# Patient Record
Sex: FEMALE | Race: WHITE | Employment: FULL TIME | URBAN - METROPOLITAN AREA
[De-identification: names, ages, dates, MRNs, and addresses within clinical notes are randomized per-mention and may not be internally consistent; named-entity substitution may affect disease eponyms.]

---

## 2022-03-18 PROBLEM — F32.A DEPRESSION: Status: ACTIVE | Noted: 2017-10-17

## 2022-03-18 PROBLEM — E03.9 HYPOTHYROIDISM: Status: ACTIVE | Noted: 2017-10-17

## 2022-03-19 PROBLEM — I10 HIGH BLOOD PRESSURE: Status: ACTIVE | Noted: 2017-10-17

## 2022-03-19 PROBLEM — E11.9 DM (DIABETES MELLITUS) (HCC): Status: ACTIVE | Noted: 2017-10-17

## 2022-07-26 ENCOUNTER — OFFICE VISIT (OUTPATIENT)
Dept: OCCUPATIONAL MEDICINE | Age: 62
End: 2022-07-26

## 2022-07-26 DIAGNOSIS — R52 BODY ACHES: Primary | ICD-10-CM

## 2022-07-26 PROCEDURE — 99213 OFFICE O/P EST LOW 20 MIN: CPT | Performed by: NURSE PRACTITIONER

## 2022-07-26 RX ORDER — MAGNESIUM OXIDE 400 MG/1
400 TABLET ORAL DAILY
Qty: 30 TABLET | Refills: 1 | Status: SHIPPED | OUTPATIENT
Start: 2022-07-26

## 2022-07-28 ENCOUNTER — OFFICE VISIT (OUTPATIENT)
Dept: OCCUPATIONAL MEDICINE | Age: 62
End: 2022-07-28

## 2022-07-28 DIAGNOSIS — R52 BODY ACHES: Primary | ICD-10-CM

## 2022-07-28 PROCEDURE — 99212 OFFICE O/P EST SF 10 MIN: CPT | Performed by: NURSE PRACTITIONER

## 2022-07-28 ASSESSMENT — ENCOUNTER SYMPTOMS
SHORTNESS OF BREATH: 0
ABDOMINAL PAIN: 0

## 2022-07-28 NOTE — PROGRESS NOTES
PROGRESS NOTE    SUBJECTIVE:   Yasmine Cartagena is a 57 y.o. female seen for a follow up visit regarding Generalized Body Aches  Pt reports that she is having body aches after her cold about 3 weeks ago. Reports she followed up with PCP and had labs--- normal.  Reports that the pain is in her legs, shoulders, back, arms. Reports that it is constant, improving barely. Reports that fatigue is beter  No falls, no new meds, no new supplements. Past Medical History, Past Surgical History, Family history, Social History, and Medications were all reviewed with the patient today and updated as necessary. Current Outpatient Medications   Medication Sig Dispense Refill    magnesium oxide (MAG-OX) 400 MG tablet Take 1 tablet by mouth in the morning. 30 tablet 1    dilTIAZem HCl ER Coated Beads 240 MG TB24 Take 240 mg by mouth daily      doxazosin (CARDURA) 2 MG tablet Take 2 mg by mouth daily      levothyroxine (SYNTHROID) 137 MCG tablet Take by mouth every morning (before breakfast)      methIMAzole (TAPAZOLE) 10 MG tablet Take by mouth daily      nystatin (MYCOSTATIN) 641601 UNIT/GM powder Apply topically 2 times daily      nystatin-triamcinolone (MYCOLOG II) 228209-5.1 UNIT/GM-% cream Apply topically 2 times daily      brompheniramine-pseudoephedrine-DM 2-30-10 MG/5ML syrup Take 5 mLs by mouth 4 times daily as needed      triamcinolone (KENALOG) 0.1 % cream Use thin layer       No current facility-administered medications for this visit. Not on File  Patient Active Problem List   Diagnosis    Depression    Hypothyroidism    High blood pressure    Sleep apnea    DM (diabetes mellitus) ([facility] [address]     Past Medical History:   Diagnosis Date    Depression 10/17/2017    DM (diabetes mellitus) ([facility] [address] 10/17/2017    High blood pressure 10/17/2017    Hypothyroidism 10/17/2017    Seasonal allergic rhinitis     Sleep apnea      No past surgical history on file.   Family History   Problem Relation Age of Onset    Hypertension Other     Diabetes Other     Depression Other      Social History     Tobacco Use    Smoking status: Unknown    Smokeless tobacco: Not on file   Substance Use Topics    Alcohol use: Yes       Review of Systems   Eyes:  Negative for visual disturbance. Respiratory:  Negative for shortness of breath. Cardiovascular:  Negative for chest pain, palpitations and leg swelling. Gastrointestinal:  Negative for abdominal pain. Neurological:  Negative for dizziness, syncope and light-headedness. OBJECTIVE:  There were no vitals taken for this visit. Physical Exam  Constitutional:       Appearance: Normal appearance. Cardiovascular:      Rate and Rhythm: Regular rhythm. Pulmonary:      Effort: Pulmonary effort is normal.      Breath sounds: Normal breath sounds. Neurological:      Mental Status: She is alert. ASSESSMENT and PLAN    Maddison Espinoza was seen today for generalized body aches. Diagnoses and all orders for this visit:    Body aches    Other orders  -     magnesium oxide (MAG-OX) 400 MG tablet; Take 1 tablet by mouth in the morning. Will get labs on Thrusday am. Reviewed diet, hydration. Take mag. Reviewed exercises, stretches.